# Patient Record
Sex: FEMALE | Race: WHITE | URBAN - METROPOLITAN AREA
[De-identification: names, ages, dates, MRNs, and addresses within clinical notes are randomized per-mention and may not be internally consistent; named-entity substitution may affect disease eponyms.]

---

## 2017-08-10 ENCOUNTER — HOSPITAL ENCOUNTER (OUTPATIENT)
Dept: LAB | Age: 27
Discharge: HOME | End: 2017-08-10
Payer: COMMERCIAL

## 2017-08-10 ENCOUNTER — OFFICE VISIT (OUTPATIENT)
Dept: FAMILY MEDICINE CLINIC | Age: 27
End: 2017-08-10

## 2017-08-10 VITALS
SYSTOLIC BLOOD PRESSURE: 96 MMHG | OXYGEN SATURATION: 98 % | WEIGHT: 139 LBS | TEMPERATURE: 97.8 F | BODY MASS INDEX: 23.73 KG/M2 | HEART RATE: 90 BPM | HEIGHT: 64 IN | RESPIRATION RATE: 18 BRPM | DIASTOLIC BLOOD PRESSURE: 62 MMHG

## 2017-08-10 DIAGNOSIS — Z01.419 WELL WOMAN EXAM WITH ROUTINE GYNECOLOGICAL EXAM: ICD-10-CM

## 2017-08-10 DIAGNOSIS — Z00.00 ROUTINE GENERAL MEDICAL EXAMINATION AT A HEALTH CARE FACILITY: Primary | ICD-10-CM

## 2017-08-10 PROCEDURE — 88175 CYTOPATH C/V AUTO FLUID REDO: CPT | Performed by: FAMILY MEDICINE

## 2017-08-10 NOTE — PROGRESS NOTES
Chief Complaint   Patient presents with    Well woman w/ gyn exam          1. Have you been to the ER, urgent care clinic since your last visit? Hospitalized since your last visit? No    2. Have you seen or consulted any other health care providers outside of the [address] [place] [address] since your last visit? Include any pap smears or colon screening.  No

## 2017-08-10 NOTE — PROGRESS NOTES
HISTORY OF PRESENT ILLNESS   HPI  Well woman w/ Gyn exam and pap. Last pap 7-2014 and wnl. Denies h/o abnormal paps. Overall has been getting along really well and feeling well in her usual good state of health. No /Gyn/breast complaints. She stopped her BCP June as she and her  hope to start a family in the next year or so. She has had a normal period since stopping the pill. She does periodic home SBE's, no masses, lumps or pain. REVIEW OF SYMPTOMS     Review of Systems   Constitutional: Negative. HENT: Negative. Eyes: Negative. Respiratory: Negative. Cardiovascular: Negative. Gastrointestinal: Negative. Negative for abdominal pain, blood in stool, constipation, diarrhea, nausea and vomiting. Genitourinary: Negative. Negative for dysuria, frequency and urgency. No complaints of abnormal vaginal discharge or bleeding, post-coital bleeding, dyspareunia, dryness, irritation or urinary complaints. No complaints of breast lumps, tenderness, mass or nipple discharge. Musculoskeletal: Negative. Skin: Negative. Neurological: Negative. Endo/Heme/Allergies: Negative. Psychiatric/Behavioral: Negative.            PROBLEM LIST/MEDICAL HISTORY      Problem List  Date Reviewed: 8/10/2017          Codes Class Noted    Vitamin D deficiency ICD-10-CM: E55.9  ICD-9-CM: 268.9  7/13/2015    Overview Signed 7/13/2015  2:12 PM by Kirby Hearn MD     ~[id number]             Left Ureteral reflux ICD-10-CM: N13.70  ICD-9-CM: 593.70  5/24/2010    Overview Signed 5/24/2010 12:50 PM by Kirby Hearn MD     12/93, 1/96             Recurrent acute otitis media ICD-10-CM: H66.90  ICD-9-CM: 382.9  5/24/2010                  PAST SURGICAL HISTORY       Past Surgical History:   Procedure Laterality Date    HX WISDOM TEETH EXTRACTION  ~2009         MEDICATIONS      No current outpatient prescriptions on file. No current facility-administered medications for this visit. ALLERGIES   No Known Allergies       SOCIAL HISTORY       Social History     Social History    Marital status:      Spouse name: N/A    Number of children: 0    Years of education: N/A     Occupational History    Graduated [address] [employer], works from home      Social History Main Topics    Smoking status: Never Smoker    Smokeless tobacco: Never Used    Alcohol use Yes      Comment: very rare    Drug use: No    Sexual activity: Yes     Partners: Male     Birth control/ protection: None      Comment: stopped bcp      Other Topics Concern    Caffeine Concern No     no coffee, occ tea or soda ~ 3 x a week    Weight Concern No    Special Diet No    Exercise Yes     walks qd x 45 minutes, dance group x 1 hour once a week     Social History Narrative    Got  2014        IMMUNIZATIONS  Immunization History   Administered Date(s) Administered    DTAP Vaccine 1989, 1989, 1989, 1990, 05/10/1994    HIB Vaccine 1990    Hepatitis A Vaccine 2006, 2006    Hepatitis B Vaccine 2000, 05/15/2000, 10/18/2000    IPV 1989, 1989, 1990, 05/10/1994    MMR Vaccine 1990, 05/10/1994    Meningococcal Vaccine 2007    TD Vaccine 2004    TDAP Vaccine 2010         FAMILY HISTORY     Family History   Problem Relation Age of Onset    Anemia Mother    [facility]ra[facility] Mother     Diabetes Mother 64     pre-diabetes, obese    Diabetes Maternal Grandmother     Arthritis-osteo Maternal Grandmother     Dementia Maternal Grandmother       age 80    Dementia Maternal Grandfather       pneumonia age 80    Cancer Paternal Grandfather       from prostate cancer in his 63's          VITALS     Visit Vitals    BP 96/62 (BP 1 Location: Left arm, BP Patient Position: Sitting)    Pulse 90    Temp 97.8 °F (36.6 °C) (Oral)    Resp 18    Ht 5' 3.5\" (1.613 m)    Wt 139 lb (63 kg)    LMP 07/24/2017    SpO2 98%    BMI 24.24 kg/m2          PHYSICAL EXAMINATION     Physical Exam   Constitutional: She is oriented to person, place, and time and well-developed, well-nourished, and in no distress. No distress. HENT:   Right Ear: Tympanic membrane normal.   Left Ear: Tympanic membrane normal.   Nose: Nose normal.   Mouth/Throat: Oropharynx is clear and moist. No oropharyngeal exudate. Eyes: Conjunctivae and EOM are normal. Pupils are equal, round, and reactive to light. Neck: Neck supple. No thyromegaly present. Cardiovascular: Normal rate, regular rhythm and normal heart sounds. No murmur heard. Pulmonary/Chest: Effort normal and breath sounds normal. No respiratory distress. Right breast exhibits no inverted nipple, no mass, no nipple discharge, no skin change and no tenderness. Left breast exhibits no inverted nipple, no mass, no nipple discharge, no skin change and no tenderness. Abdominal: Soft. Bowel sounds are normal. She exhibits no distension and no mass. There is no tenderness. Genitourinary: Vagina normal, uterus normal, cervix normal, right adnexa normal and left adnexa normal. No vaginal discharge found. Musculoskeletal: Normal range of motion. She exhibits no edema or tenderness. Lymphadenopathy:     She has no cervical adenopathy. She has no axillary adenopathy. Right: No supraclavicular adenopathy present. Left: No supraclavicular adenopathy present. Neurological: She is alert and oriented to person, place, and time. Skin: Skin is warm and dry. Psychiatric: Mood and affect normal.   Vitals reviewed.              ASSESSMENT & PLAN       ICD-10-CM ICD-9-CM    1. Routine general medical examination at a health care facility Z00.00 V70.0    2. Well woman exam with routine gynecological exam Z01.419 V72.31 PAP IG, RFX HPV ASCU, 01&24,40([id number])     Pap collected today. Monthly SBE counseling. Family planning counseling.   She stopped her BCP June 2017 as she and her  plan on trying to start a family sometime in the next year  I recommended she go ahead and start on a PNV at this time. Reviewed diet, nutrition, exercise, wt mgt, health maintenance, prenatal counseling and general wellness, screening recommendations.   RTC 1 yr annual checkup or sooner prn

## 2017-08-10 NOTE — PATIENT INSTRUCTIONS
Well Visit, Ages 25 to 48: Care Instructions  Your Care Instructions  Physical exams can help you stay healthy. Your doctor has checked your overall health and may have suggested ways to take good care of yourself. He or she also may have recommended tests. At home, you can help prevent illness with healthy eating, regular exercise, and other steps. Follow-up care is a key part of your treatment and safety. Be sure to make and go to all appointments, and call your doctor if you are having problems. It's also a good idea to know your test results and keep a list of the medicines you take. How can you care for yourself at home? · Reach and stay at a healthy weight. This will lower your risk for many problems, such as obesity, diabetes, heart disease, and high blood pressure. · Get at least 30 minutes of physical activity on most days of the week. Walking is a good choice. You also may want to do other activities, such as running, swimming, cycling, or playing tennis or team sports. Discuss any changes in your exercise program with your doctor. · Do not smoke or allow others to smoke around you. If you need help quitting, talk to your doctor about stop-smoking programs and medicines. These can increase your chances of quitting for good. · Talk to your doctor about whether you have any risk factors for sexually transmitted infections (STIs). Having one sex partner (who does not have STIs and does not have sex with anyone else) is a good way to avoid these infections. · Use birth control if you do not want to have children at this time. Talk with your doctor about the choices available and what might be best for you. · Protect your skin from too much sun. When you're outdoors from 10 a.m. to 4 p.m., stay in the shade or cover up with clothing and a hat with a wide brim. Wear sunglasses that block UV rays. Even when it's cloudy, put broad-spectrum sunscreen (SPF 30 or higher) on any exposed skin.   · See a dentist one or two times a year for checkups and to have your teeth cleaned. · Wear a seat belt in the car. · Drink alcohol in moderation, if at all. That means no more than 2 drinks a day for men and 1 drink a day for women. Follow your doctor's advice about when to have certain tests. These tests can spot problems early. For everyone  · Cholesterol. Have the fat (cholesterol) in your blood tested after age 21. Your doctor will tell you how often to have this done based on your age, family history, or other things that can increase your risk for heart disease. · Blood pressure. Have your blood pressure checked during a routine doctor visit. Your doctor will tell you how often to check your blood pressure based on your age, your blood pressure results, and other factors. · Vision. Talk with your doctor about how often to have a glaucoma test.  · Diabetes. Ask your doctor whether you should have tests for diabetes. · Colon cancer. Have a test for colon cancer at age 48. You may have one of several tests. If you are younger than 48, you may need a test earlier if you have any risk factors. Risk factors include whether you already had a precancerous polyp removed from your colon or whether your parent, brother, sister, or child has had colon cancer. For women  · Breast exam and mammogram. Talk to your doctor about when you should have a clinical breast exam and a mammogram. Medical experts differ on whether and how often women under 50 should have these tests. Your doctor can help you decide what is right for you. · Pap test and pelvic exam. Begin Pap tests at age 24. A Pap test is the best way to find cervical cancer. The test often is part of a pelvic exam. Ask how often to have this test.  · Tests for sexually transmitted infections (STIs). Ask whether you should have tests for STIs. You may be at risk if you have sex with more than one person, especially if your partners do not wear condoms.   For men  · Tests for sexually transmitted infections (STIs). Ask whether you should have tests for STIs. You may be at risk if you have sex with more than one person, especially if you do not wear a condom. · Testicular cancer exam. Ask your doctor whether you should check your testicles regularly. · Prostate exam. Talk to your doctor about whether you should have a blood test (called a PSA test) for prostate cancer. Experts differ on whether and when men should have this test. Some experts suggest it if you are older than 39 and are -American or have a father or brother who got prostate cancer when he was younger than 72. When should you call for help? Watch closely for changes in your health, and be sure to contact your doctor if you have any problems or symptoms that concern you. Where can you learn more? Go to http://jose juan-milan.info/. Enter P072 in the search box to learn more about \"Well Visit, Ages 25 to 48: Care Instructions. \"  Current as of: July 19, 2016  Content Version: 11.3  © 9276-5727 [employer]. Care instructions adapted under license by [employer] (which disclaims liability or warranty for this information). If you have questions about a medical condition or this instruction, always ask your healthcare professional. [place] [address] any warranty or liability for your use of this information. Prenatal Vitamins (By mouth)   Prenatal vitamins are used to supplement the diet during pregnancy and during breast feeding. Brand Name(s): Active OB, BP MultiNatal Plus, Bal-Care DHA, Bal-Care DHA Essential, Basic's Prenatal Vitamins, C-Filiberto DHA, Hubbard PreNatal Multiple With DHA, Cavan-EC SOD DHA, Centrum Specialist Prenatal, CitraNatal 90 DHA, CitraNatal Assure, CitraNatal B-Calm, CitraNatal DHA, CitraNatal [place], CitraNatal Rx   There may be other brand names for this medicine. When This Medicine Should Not Be Used:    You should not use prenatal vitamins if you have ever had an allergic reaction to a vitamin or mineral supplement. How to Use This Medicine:   Spray, Kit, Coated Tablet, Capsule, Chewable Tablet, Liquid Filled Capsule, Powder, Tablet  · Your doctor will tell you how much medicine to use. Do not use more than directed. · Swallow the tablet, enteric coated tablet, capsule, or liquid filled capsule whole. Do not break, chew, or crush it. · The chewable tablet should be chewed completely before you swallow it. · Dissolve the powder in 4 to 5 ounces of water and drink the mixture right away. · Spray the mouth spray on the inside of your cheek. Hold the medicine in your mouth for about 30 seconds, then swallow. If a dose is missed:   · Take a dose as soon as you remember. If it is almost time for your next dose, wait until then and take a regular dose. Do not take extra medicine to make up for a missed dose. How to Store and Dispose of This Medicine:   · Store the medicine in a closed container at room temperature, away from heat, moisture, and direct light. · Ask your pharmacist, doctor, or health caregiver about the best way to dispose of any outdated medicine or medicine no longer needed. · Keep all medicine out of the reach of children. Never share your medicine with anyone. Drugs and Foods to Avoid:   Ask your doctor or pharmacist before using any other medicine, including over-the-counter medicines, vitamins, and herbal products. · You should not use other vitamin and mineral supplements while you are using prenatal vitamins. · There are many drugs that can interact with a prenatal multivitamin. Make sure your doctor knows about all other medicines you are using. Warnings While Using This Medicine:   · Make sure your doctor knows if you have kidney disease or have ever had kidney stones. Tell your doctor if you have blood problems such as pernicious anemia or too much iron.   · You should not use certain brands of this medicine if you have kidney disease or are on dialysis, because they may harm your kidneys. Ask your caregiver what brands are best for you. · This medicine might have enough iron to poison a small child. Be very careful to keep this medicine out of the reach of children. If a child does swallow some of this medicine, call a doctor or poison control center right away. Possible Side Effects While Using This Medicine: If you notice these less serious side effects, talk with your doctor:  · Dark stools or constipation. · Mild nausea. If you notice other side effects that you think are caused by this medicine, tell your doctor. Call your doctor for medical advice about side effects. You may report side effects to FDA at 3-297-FDA-2854  © 2017 [address] Information is for End User's use only and may not be sold, redistributed or otherwise used for commercial purposes. The above information is an  only. It is not intended as medical advice for individual conditions or treatments. Talk to your doctor, nurse or pharmacist before following any medical regimen to see if it is safe and effective for you.

## 2017-08-10 NOTE — MR AVS SNAPSHOT
Visit Information Date & Time Provider Department Dept. Phone Encounter #  
 8/10/2017 11:00 AM Mery Spence, [address] [phone] [id number] Upcoming Health Maintenance Date Due INFLUENZA AGE 9 TO ADULT 8/1/2017 DTaP/Tdap/Td series (7 - Td) 5/24/2020 PAP AKA CERVICAL CYTOLOGY 8/10/2020 Allergies as of 8/10/2017  Review Complete On: 8/10/2017 By: Mery Spence MD  
 No Known Allergies Current Immunizations  Reviewed on 8/9/2016 Name Date DTAP Vaccine 5/10/1994, 8/3/1990, 1989, 1989, 1989 HIB Vaccine 8/3/1990 Hepatitis A Vaccine 12/20/2006, 4/7/2006 Hepatitis B Vaccine 10/18/2000, 5/15/2000, 4/12/2000 IPV 5/10/1994, 8/3/1990, 1989, 1989 MMR Vaccine 5/10/1994, 8/3/1990 Meningococcal Vaccine 6/4/2007 TD Vaccine 1/9/2004 TDAP Vaccine 5/24/2010 Not reviewed this visit You Were Diagnosed With   
  
 Codes Comments Routine general medical examination at a health care facility    -  Primary ICD-10-CM: Z00.00 ICD-9-CM: V70.0 Well woman exam with routine gynecological exam     ICD-10-CM: R98.974 ICD-9-CM: V72.31 Vitals BP Pulse Temp Resp Height(growth percentile) Weight(growth percentile) 96/62 (BP 1 Location: Left arm, BP Patient Position: Sitting) 90 97.8 °F (36.6 °C) (Oral) 18 5' 3.5\" (1.613 m) 139 lb (63 kg) LMP SpO2 BMI OB Status Smoking Status 07/24/2017 98% 24.24 kg/m2 Having regular periods Never Smoker Vitals History BMI and BSA Data Body Mass Index Body Surface Area  
 24.24 kg/m 2 1.68 m 2 Preferred Pharmacy [facility] Phone [id number] [address] ([place] [address]) [phone] Your Updated Medication List  
  
Notice  As of 8/10/2017 11:53 AM  
 You have not been prescribed any medications. We Performed the Following PAP IG, [address] HPV ASCU, [id number]) [[id number] Custom] Patient Instructions Well Visit, Ages 25 to 48: Care Instructions Your Care Instructions Physical exams can help you stay healthy. Your doctor has checked your overall health and may have suggested ways to take good care of yourself. He or she also may have recommended tests. At home, you can help prevent illness with healthy eating, regular exercise, and other steps. Follow-up care is a key part of your treatment and safety. Be sure to make and go to all appointments, and call your doctor if you are having problems. It's also a good idea to know your test results and keep a list of the medicines you take. How can you care for yourself at home? · Reach and stay at a healthy weight. This will lower your risk for many problems, such as obesity, diabetes, heart disease, and high blood pressure. · Get at least 30 minutes of physical activity on most days of the week. Walking is a good choice. You also may want to do other activities, such as running, swimming, cycling, or playing tennis or team sports. Discuss any changes in your exercise program with your doctor. · Do not smoke or allow others to smoke around you. If you need help quitting, talk to your doctor about stop-smoking programs and medicines. These can increase your chances of quitting for good. · Talk to your doctor about whether you have any risk factors for sexually transmitted infections (STIs). Having one sex partner (who does not have STIs and does not have sex with anyone else) is a good way to avoid these infections. · Use birth control if you do not want to have children at this time. Talk with your doctor about the choices available and what might be best for you. · Protect your skin from too much sun.  When you're outdoors from 10 a.m. to 4 p.m., stay in the shade or cover up with clothing and a hat with a wide brim. Wear sunglasses that block UV rays. Even when it's cloudy, put broad-spectrum sunscreen (SPF 30 or higher) on any exposed skin. · See a dentist one or two times a year for checkups and to have your teeth cleaned. · Wear a seat belt in the car. · Drink alcohol in moderation, if at all. That means no more than 2 drinks a day for men and 1 drink a day for women. Follow your doctor's advice about when to have certain tests. These tests can spot problems early. For everyone · Cholesterol. Have the fat (cholesterol) in your blood tested after age 21. Your doctor will tell you how often to have this done based on your age, family history, or other things that can increase your risk for heart disease. · Blood pressure. Have your blood pressure checked during a routine doctor visit. Your doctor will tell you how often to check your blood pressure based on your age, your blood pressure results, and other factors. · Vision. Talk with your doctor about how often to have a glaucoma test. 
· Diabetes. Ask your doctor whether you should have tests for diabetes. · Colon cancer. Have a test for colon cancer at age 48. You may have one of several tests. If you are younger than 48, you may need a test earlier if you have any risk factors. Risk factors include whether you already had a precancerous polyp removed from your colon or whether your parent, brother, sister, or child has had colon cancer. For women · Breast exam and mammogram. Talk to your doctor about when you should have a clinical breast exam and a mammogram. Medical experts differ on whether and how often women under 50 should have these tests. Your doctor can help you decide what is right for you. · Pap test and pelvic exam. Begin Pap tests at age 24. A Pap test is the best way to find cervical cancer.  The test often is part of a pelvic exam. Ask how often to have this test. 
 · Tests for sexually transmitted infections (STIs). Ask whether you should have tests for STIs. You may be at risk if you have sex with more than one person, especially if your partners do not wear condoms. For men · Tests for sexually transmitted infections (STIs). Ask whether you should have tests for STIs. You may be at risk if you have sex with more than one person, especially if you do not wear a condom. · Testicular cancer exam. Ask your doctor whether you should check your testicles regularly. · Prostate exam. Talk to your doctor about whether you should have a blood test (called a PSA test) for prostate cancer. Experts differ on whether and when men should have this test. Some experts suggest it if you are older than 39 and are -American or have a father or brother who got prostate cancer when he was younger than 72. When should you call for help? Watch closely for changes in your health, and be sure to contact your doctor if you have any problems or symptoms that concern you. Where can you learn more? Go to http://jose juan-milan.info/. Enter P072 in the search box to learn more about \"Well Visit, Ages 25 to 48: Care Instructions. \" Current as of: July 19, 2016 Content Version: 11.3 © 9827-8211 [employer]. Care instructions adapted under license by [employer] (which disclaims liability or warranty for this information). If you have questions about a medical condition or this instruction, always ask your healthcare professional. [place] [address] any warranty or liability for your use of this information. Prenatal Vitamins (By mouth) Prenatal vitamins are used to supplement the diet during pregnancy and during breast feeding. Brand Name(s):  Active OB, BP MultiNatal Plus, Bal-Care DHA, Bal-Care DHA Essential, Basic's Prenatal Vitamins, C-Filiberto DHA, Hubbard PreNatal Multiple With DHA, Cavan-EC SOD DHA, Centrum Specialist Prenatal, CitraNatal 90 DHA, CitraNatal Assure, CitraNatal B-Calm, CitraNatal DHA, CitraNatal [place], CitraNatal Rx There may be other brand names for this medicine. When This Medicine Should Not Be Used: You should not use prenatal vitamins if you have ever had an allergic reaction to a vitamin or mineral supplement. How to Use This Medicine:  
Spray, Kit, Coated Tablet, Capsule, Chewable Tablet, Liquid Filled Capsule, Powder, Tablet · Your doctor will tell you how much medicine to use. Do not use more than directed. · Swallow the tablet, enteric coated tablet, capsule, or liquid filled capsule whole. Do not break, chew, or crush it. · The chewable tablet should be chewed completely before you swallow it. · Dissolve the powder in 4 to 5 ounces of water and drink the mixture right away. · Spray the mouth spray on the inside of your cheek. Hold the medicine in your mouth for about 30 seconds, then swallow. If a dose is missed: · Take a dose as soon as you remember. If it is almost time for your next dose, wait until then and take a regular dose. Do not take extra medicine to make up for a missed dose. How to Store and Dispose of This Medicine: · Store the medicine in a closed container at room temperature, away from heat, moisture, and direct light. · Ask your pharmacist, doctor, or health caregiver about the best way to dispose of any outdated medicine or medicine no longer needed. · Keep all medicine out of the reach of children. Never share your medicine with anyone. Drugs and Foods to Avoid: Ask your doctor or pharmacist before using any other medicine, including over-the-counter medicines, vitamins, and herbal products. · You should not use other vitamin and mineral supplements while you are using prenatal vitamins. · There are many drugs that can interact with a prenatal multivitamin. Make sure your doctor knows about all other medicines you are using. Warnings While Using This Medicine: · Make sure your doctor knows if you have kidney disease or have ever had kidney stones. Tell your doctor if you have blood problems such as pernicious anemia or too much iron. · You should not use certain brands of this medicine if you have kidney disease or are on dialysis, because they may harm your kidneys. Ask your caregiver what brands are best for you. · This medicine might have enough iron to poison a small child. Be very careful to keep this medicine out of the reach of children. If a child does swallow some of this medicine, call a doctor or poison control center right away. Possible Side Effects While Using This Medicine: If you notice these less serious side effects, talk with your doctor: · Dark stools or constipation. · Mild nausea. If you notice other side effects that you think are caused by this medicine, tell your doctor. Call your doctor for medical advice about side effects. You may report side effects to FDA at 5-727-FDA-5469 © 2017 [facility] Information is for End User's use only and may not be sold, redistributed or otherwise used for commercial purposes. The above information is an  only. It is not intended as medical advice for individual conditions or treatments. Talk to your doctor, nurse or pharmacist before following any medical regimen to see if it is safe and effective for you. Introducing hospitals & HEALTH SERVICES! [facility] introduces [employer] patient portal. Now you can access parts of your medical record, email your doctor's office, and request medication refills online. 1. In your internet browser, go to [email or web address] 2. Click on the First Time User? Click Here link in the Sign In box. You will see the New Member Sign Up page. 3. Enter your [employer] Access Code exactly as it appears below. You will not need to use this code after youve completed the sign-up process.  If you do not sign up before the expiration date, you must request a new code. · [employer] Access Code: [email or web address] Expires: 11/8/2017 11:05 AM 
 
4. Enter the last four digits of your Social Security Number (xxxx) and Date of Birth (mm/dd/yyyy) as indicated and click Submit. You will be taken to the next sign-up page. 5. Create a [employer] ID. This will be your [employer] login ID and cannot be changed, so think of one that is secure and easy to remember. 6. Create a [employer] password. You can change your password at any time. 7. Enter your Password Reset Question and Answer. This can be used at a later time if you forget your password. 8. Enter your e-mail address. You will receive e-mail notification when new information is available in [address]. 9. Click Sign Up. You can now view and download portions of your medical record. 10. Click the Download Summary menu link to download a portable copy of your medical information. If you have questions, please visit the Frequently Asked Questions section of the [employer] website. Remember, [employer] is NOT to be used for urgent needs. For medical emergencies, dial 911. Now available from your iPhone and Android! Please provide this summary of care documentation to your next provider. Your primary care clinician is listed as ELISABETH COOPER. If you have any questions after today's visit, please call [phone].